# Patient Record
Sex: MALE | Race: BLACK OR AFRICAN AMERICAN | Employment: UNEMPLOYED | URBAN - METROPOLITAN AREA
[De-identification: names, ages, dates, MRNs, and addresses within clinical notes are randomized per-mention and may not be internally consistent; named-entity substitution may affect disease eponyms.]

---

## 2017-04-13 ENCOUNTER — OFFICE VISIT (OUTPATIENT)
Dept: FAMILY MEDICINE | Facility: CLINIC | Age: 27
End: 2017-04-13

## 2017-04-13 VITALS
TEMPERATURE: 98.4 F | BODY MASS INDEX: 46.58 KG/M2 | OXYGEN SATURATION: 97 % | HEART RATE: 80 BPM | HEIGHT: 67 IN | DIASTOLIC BLOOD PRESSURE: 85 MMHG | WEIGHT: 296.8 LBS | SYSTOLIC BLOOD PRESSURE: 138 MMHG

## 2017-04-13 DIAGNOSIS — Z00.00 ROUTINE GENERAL MEDICAL EXAMINATION AT A HEALTH CARE FACILITY: Primary | ICD-10-CM

## 2017-04-13 DIAGNOSIS — H61.21 IMPACTED CERUMEN OF RIGHT EAR: ICD-10-CM

## 2017-04-13 DIAGNOSIS — K21.9 GASTROESOPHAGEAL REFLUX DISEASE WITHOUT ESOPHAGITIS: ICD-10-CM

## 2017-04-13 DIAGNOSIS — E66.01 MORBID OBESITY DUE TO EXCESS CALORIES (H): ICD-10-CM

## 2017-04-13 LAB
CHOLEST SERPL-MCNC: 185 MG/DL
CHOLEST/HDLC SERPL: 3.6 RATIO
HBA1C MFR BLD: 5.6 % (ref 4.1–5.7)
HDLC SERPL-MCNC: 52 MG/DL
LDLC SERPL CALC-MCNC: 116 MG/DL (ref 0–99)
TRIGL SERPL-MCNC: 87 MG/DL
VLDL-CHOLESTEROL: 17 MG/DL (ref 7–32)

## 2017-04-13 NOTE — PROGRESS NOTES
Male Physical Note      Concerns today:     Ear check: history of cerumen impaction and would like ears checked    Heart burn:   - has a few times a week depending on what he eats  - notices is worse after spicy or fatty foods  - in the past has used Prilosec, not currently using any meds  - no weight loss, difficulty swallowing, hematemesis     Right thumb pain:   - pain at base of thumb for past 1 week  - doesn't remember exact injury, thinks he hurt it while lifting a heavy box  - no swelling or bruising  - pain has been getting better over the past few days    Family Hx: dad has diabetes, mom HTN and depression  PMH: GERD  PSH: none  Meds: none  Social: single, has a 5 yo daughter, not working, lives with mother, non-smoker, no alcohol, no drug use    ROS:                      CONSTITUTIONAL: no fatigue, no unexpected change in weight  SKIN: no worrisome rashes, no worrisome moles, no worrisome lesions  EYES: no acute vision problems or changes  ENT:  no mouth problems, no throat problems  RESP: no significant cough, no shortness of breath  CV: no chest pain, no palpitations, no new or worsening peripheral edema  GI: no nausea, no vomiting, no constipation, no diarrhea  : no frequency, no dysuria, no hematuria  NEURO: no weakness, no dizziness, no syncope, no headaches    Past Medical History:   Diagnosis Date     NO ACTIVE PROBLEMS         Family History   Problem Relation Age of Onset     Hypertension Mother      Asthma Brother      DIABETES No family hx of      Coronary Artery Disease No family hx of      Breast Cancer No family hx of      Prostate Cancer No family hx of      Colon Cancer No family hx of      Other Cancer No family hx of      Reviewed no other significant FH           Family History and past Medical History reviewed and unchanged/updated.    Social History   Substance Use Topics     Smoking status: Never Smoker     Smokeless tobacco: Not on file     Alcohol use No     Single  Children ?  "yes 5 yo daughter, doesn't see her often, lives with her mom    Has anyone hurt you physically, for example by pushing, hitting, slapping or kicking you or forcing you to have sex? Denies  Do you feel threatened or controlled by a partner, ex-partner or anyone in your life? Denies    RISK BEHAVIORS AND HEALTHY HABITS:  Tobacco Use/Smoking: None  Illicit Drug Use: None  ETOH: None  Do you use alcohol? No  Sexually Active: Yes and At risk sexual behavior?  No  Diet (5-7 servings of fruits/veg daily): No   Exercise (30 min accumulated most days):No  Dental Care: Yes   Calcium 1500 mg/d:    Seat Belt Use: Yes       Immunization History   Administered Date(s) Administered     TDAP Vaccine (Boostrix) 10/13/2016     Reviewed Immunization Record Today    EXAMINATION:  /82 (BP Location: Right arm, Patient Position: Chair, Cuff Size: Adult Large)  Pulse 92  Temp 98.4  F (36.9  C) (Oral)  Ht 5' 7.25\" (170.8 cm)  Wt 296 lb 12.8 oz (134.6 kg)  SpO2 97%  BMI 46.14 kg/m2  GENERAL: healthy, alert and no distress  EYES: Eyes grossly normal to inspection, extraocular movements - intact, and PERRL  HENT: ear canals- right ear with cerumen impaction, left canal is clear; TMs- normal; Nose- normal; Mouth- no ulcers, no lesions  NECK: no tenderness, no adenopathy, no asymmetry, no masses, no stiffness; thyroid- normal to palpation  RESP: lungs clear to auscultation - no rales, no rhonchi, no wheezes  CV: regular rates and rhythm, normal S1 S2, no S3 or S4 and no murmur, no click or rub -  ABDOMEN: soft, no tenderness, no  hepatosplenomegaly, no masses, normal bowel sounds  MS: extremities- no gross deformities noted, no edema, right thumb with mild tenderness near the radial side of the MCP joint, no edema or erythema, full ROM intact, normal strength, no ligamentous laxity  SKIN: no suspicious lesions, no rashes  NEURO: strength and tone- normal, sensory exam- grossly normal, mentation- intact, speech- normal, reflexes- " symmetric  BACK: no CVA tenderness, no paralumbar tenderness  PSYCH: Alert and oriented times 3; speech- coherent , normal rate and volume; able to articulate logical thoughts, able to abstract reason, no tangential thoughts, no hallucinations or delusions, affect- normal  LYMPHATICS: ant. cervical- normal, post. cervical- normal, axillary- normal, supraclavicular- normal, inguinal- normal    Results for orders placed or performed in visit on 04/13/17   Hemoglobin A1c ([facility])   Result Value Ref Range    Hemoglobin A1C 5.6 4.1 - 5.7 %   Lipid Panel ([facility]) - Results < 1 hr   Result Value Ref Range    Cholesterol 185.0 <200.0 mg/dL    Triglycerides 87.0 <150.0 mg/dL    HDL Cholesterol 52.0 >40.0 mg/dL    VLDL-Cholesterol 17.0 7.0 - 32.0 mg/dL    LDL Cholesterol Direct 116.0 (H) 0.0 - 99.0 mg/dL    Cholesterol/HDL Ratio 3.6 <5.0 RATIO         ASSESSMENT/PLAN:    Morbid obesity: BMI 46. Patient reports eating fast food daily. Has 1-2 sugary drinks daily. Is not physically active. Patient is at the contemplative state for behavior change.   - Discussed cutting out fast food and sugary drinks, working on increasing fruits and vegetables, starting a routine to get exercise/activity almost everyday  - checked lipids and A1c  - follow up in 6-8 weeks, discuss referral to nutrition/obesity clinic at that time    HCM:   - lipids and A1c checked    GERD:   - Discussed diet modification  - ranitidine 150 mg BID PRN    Cerumen impaction: right ear irrigated in clinic    Right thumb sprain: no evidence of significant injury on exam. Symptoms are improving. Suspect mild sprain. Recommend icing and using Tylenol as needed.     Follow up 6-8 weeks    Manny Barton MD PGY3  [facility]   Pager# [phone]    Precepted with : Dr. Victor

## 2017-04-13 NOTE — MR AVS SNAPSHOT
After Visit Summary   4/13/2017    Joann Navarrete    MRN: [id number]           Patient Information     Date Of Birth          1990        Visit Information        Provider Department      4/13/2017 2:40 PM Manny Barton MD [facility]        Today's Diagnoses     Routine general medical examination at a health care facility    -  1    Gastroesophageal reflux disease without esophagitis        Obesity due to excess calories, unspecified obesity severity          Care Instructions    Try to get exercise or activity everyday. Start walking more or riding bike.   Cut down on fast food. Cut out sugary juice and pop.   Eat more fruits, vegetables.     Preventive Health Recommendations  Male Ages 18 - 25     Yearly exam:             See your health care provider every year in order to  o   Review health changes.   o   Discuss preventive care.    o   Review your medicines if your doctor has prescribed any.    You should be tested each year for STDs (sexually transmitted diseases).     Talk to your provider about cholesterol testing.      If you are at risk for diabetes, you should have a diabetes test (fasting glucose).    Shots: Get a flu shot each year. Get a tetanus shot every 10 years.     Nutrition:    Eat at least 5 servings of fruits and vegetables daily.     Eat whole-grain bread, whole-wheat pasta and brown rice instead of white grains and rice.     Talk to your provider about calcium and Vitamin D.     Lifestyle    Exercise for at least 150 minutes a week (30 minutes a day, 5 days a week). This will help you control your weight and prevent disease.     Limit alcohol to one drink per day.     No smoking.     Wear sunscreen to prevent skin cancer.     See your dentist every six months for an exam and cleaning.   Foods you may want to avoid for heart burn include:    Peppers.    Chocolate.    High-fat foods, including fried foods.    Spicy foods.    Garlic and onions.     "Citrus fruits, including oranges, grapefruit, gisselle, and limes.    Food containing tomatoes or tomato products.    Mint.    Carbonated drinks, caffeinated drinks, and alcohol.    Vinegar.          Follow-ups after your visit        Who to contact     Please call your clinic at [phone] to:    Ask questions about your health    Make or cancel appointments    Discuss your medicines    Learn about your test results    Speak to your doctor   If you have compliments or concerns about an experience at your clinic, or if you wish to file a complaint, please contact [facility] Patient Relations at [phone] or email us at [email or web address]         Additional Information About Your Visit        [employer] Information     [employer] gives you secure access to your electronic health record. If you see a primary care provider, you can also send messages to your care team and make appointments. If you have questions, please call your primary care clinic.  If you do not have a primary care provider, please call [phone] and they will assist you.      [employer] is an electronic gateway that provides easy, online access to your medical records. With [employer], you can request a clinic appointment, read your test results, renew a prescription or communicate with your care team.     To access your existing account, please contact your [facility] Clinic or call [phone] for assistance.        Care EveryWhere ID     This is your Care EveryWhere ID. This could be used by other organizations to access your [place] medical records  [id number]        Your Vitals Were     Pulse Temperature Height Pulse Oximetry BMI (Body Mass Index)       80 98.4  F (36.9  C) (Oral) 5' 7.25\" (170.8 cm) 97% 46.14 kg/m2        Blood Pressure from Last 3 Encounters:   04/13/17 138/85   10/26/16 137/80   10/19/16 136/84    Weight from Last 3 Encounters:   04/13/17 296 lb 12.8 oz (134.6 " kg)   10/26/16 273 lb (123.8 kg)   10/19/16 273 lb (123.8 kg)              We Performed the Following     Hemoglobin A1c ([facility])     Lipid Panel ([facility]) - Results < 1 hr          Today's Medication Changes          These changes are accurate as of: 4/13/17  4:28 PM.  If you have any questions, ask your nurse or doctor.               Start taking these medicines.        Dose/Directions    ranitidine 150 MG tablet   Commonly known as:  ZANTAC   Used for:  Gastroesophageal reflux disease without esophagitis   Started by:  Manny Barton MD        Dose:  150 mg   Take 1 tablet (150 mg) by mouth 2 times daily   Quantity:  60 tablet   Refills:  1            Where to get your medicines      These medications were sent to [id number] - [place], [place] - [address] AT [address]  [address] [address] , [place] [address]     Phone:  [phone]     ranitidine 150 MG tablet                Primary Care Provider Office Phone # Fax #    Charla Jade [phone]       [facility] [address] [place] [address]        Thank you!     Thank you for choosing [facility]  for your care. Our goal is always to provide you with excellent care. Hearing back from our patients is one way we can continue to improve our services. Please take a few minutes to complete the written survey that you may receive in the mail after your visit with us. Thank you!             Your Updated Medication List - Protect others around you: Learn how to safely use, store and throw away your medicines at www.disposemymeds.org.          This list is accurate as of: 4/13/17  4:28 PM.  Always use your most recent med list.                   Brand Name Dispense Instructions for use    acetaminophen 325 MG tablet    TYLENOL    100 tablet    Take 2 tablets (650 mg) by mouth every 4 hours as needed for mild pain       amoxicillin-clavulanate 500-125 MG per  tablet    AUGMENTIN    30 tablet    Take 1 tablet by mouth 3 times daily       carbamide peroxide 6.5 % otic solution    DEBROX    30 mL    Place 5-10 drops into both ears daily as needed for other       * cyclobenzaprine 10 MG tablet    FLEXERIL     Take 10 mg by mouth Reported on 4/13/2017       * cyclobenzaprine 5 MG tablet    FLEXERIL    42 tablet    Take 1-2 tablets (5-10 mg) by mouth 3 times daily as needed for muscle spasms       ibuprofen 200 MG tablet    ADVIL/MOTRIN    120 tablet    Take 1 tablet (200 mg) by mouth every 4 hours as needed for mild pain       ranitidine 150 MG tablet    ZANTAC    60 tablet    Take 1 tablet (150 mg) by mouth 2 times daily       * Notice:  This list has 2 medication(s) that are the same as other medications prescribed for you. Read the directions carefully, and ask your doctor or other care provider to review them with you.

## 2017-04-13 NOTE — PROGRESS NOTES
Preceptor Attestation:  Patient's case reviewed and discussed with Manny Barton MD Patient seen and discussed with the resident.. I agree with assessment and plan of care.  Supervising Physician:  Vitor Victor MD  [facility]

## 2017-04-13 NOTE — PATIENT INSTRUCTIONS
Try to get exercise or activity everyday. Start walking more or riding bike.   Cut down on fast food. Cut out sugary juice and pop.   Eat more fruits, vegetables.     Preventive Health Recommendations  Male Ages 18 - 25     Yearly exam:             See your health care provider every year in order to  o   Review health changes.   o   Discuss preventive care.    o   Review your medicines if your doctor has prescribed any.    You should be tested each year for STDs (sexually transmitted diseases).     Talk to your provider about cholesterol testing.      If you are at risk for diabetes, you should have a diabetes test (fasting glucose).    Shots: Get a flu shot each year. Get a tetanus shot every 10 years.     Nutrition:    Eat at least 5 servings of fruits and vegetables daily.     Eat whole-grain bread, whole-wheat pasta and brown rice instead of white grains and rice.     Talk to your provider about calcium and Vitamin D.     Lifestyle    Exercise for at least 150 minutes a week (30 minutes a day, 5 days a week). This will help you control your weight and prevent disease.     Limit alcohol to one drink per day.     No smoking.     Wear sunscreen to prevent skin cancer.     See your dentist every six months for an exam and cleaning.   Foods you may want to avoid for heart burn include:    Peppers.    Chocolate.    High-fat foods, including fried foods.    Spicy foods.    Garlic and onions.    Citrus fruits, including oranges, grapefruit, gisselle, and limes.    Food containing tomatoes or tomato products.    Mint.    Carbonated drinks, caffeinated drinks, and alcohol.    Vinegar.

## 2017-04-23 PROBLEM — E66.01 MORBID OBESITY DUE TO EXCESS CALORIES (H): Status: ACTIVE | Noted: 2017-04-23

## 2017-04-23 PROBLEM — K21.9 GASTROESOPHAGEAL REFLUX DISEASE WITHOUT ESOPHAGITIS: Status: ACTIVE | Noted: 2017-04-23

## 2017-07-05 ENCOUNTER — OFFICE VISIT (OUTPATIENT)
Dept: FAMILY MEDICINE | Facility: CLINIC | Age: 27
End: 2017-07-05

## 2017-07-05 VITALS
DIASTOLIC BLOOD PRESSURE: 84 MMHG | TEMPERATURE: 98.2 F | WEIGHT: 296 LBS | BODY MASS INDEX: 46.02 KG/M2 | HEART RATE: 90 BPM | OXYGEN SATURATION: 92 % | SYSTOLIC BLOOD PRESSURE: 129 MMHG
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
## 2017-07-05 DIAGNOSIS — T14.8XXA BITE WOUND: ICD-10-CM

## 2017-07-05 DIAGNOSIS — S51.851A HUMAN BITE OF FOREARM, RIGHT, INITIAL ENCOUNTER: Primary | ICD-10-CM

## 2017-07-05 LAB — HIV 1+2 AB+HIV1 P24 AG SERPL QL IA: NEGATIVE
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
## 2017-07-05 NOTE — PROGRESS NOTES
Preceptor Attestation:  Patient's case reviewed and discussed with Manny Velez MD Patient seen and discussed with the resident.. I agree with assessment and plan of care.  Supervising Physician:  Horacio Diane MD  [facility]

## 2017-07-08 LAB — CULTURE: NORMAL

## 2018-05-04 ENCOUNTER — OFFICE VISIT (OUTPATIENT)
Dept: FAMILY MEDICINE | Facility: CLINIC | Age: 28
End: 2018-05-04
Payer: COMMERCIAL

## 2018-05-04 VITALS
BODY MASS INDEX: 45.83 KG/M2 | TEMPERATURE: 97.9 F | WEIGHT: 292 LBS | HEIGHT: 67 IN | OXYGEN SATURATION: 97 % | HEART RATE: 79 BPM | SYSTOLIC BLOOD PRESSURE: 133 MMHG | DIASTOLIC BLOOD PRESSURE: 84 MMHG | RESPIRATION RATE: 18 BRPM

## 2018-05-04 DIAGNOSIS — R06.2 WHEEZING: Primary | ICD-10-CM

## 2018-05-04 DIAGNOSIS — M25.469 KNEE SWELLING: ICD-10-CM

## 2018-05-04 RX ORDER — ACETAMINOPHEN 325 MG/1
650 TABLET ORAL EVERY 4 HOURS PRN
Qty: 100 TABLET | Refills: 1 | Status: SHIPPED | OUTPATIENT
Start: 2018-05-04

## 2018-05-04 RX ORDER — IBUPROFEN 600 MG/1
600 TABLET, FILM COATED ORAL EVERY 6 HOURS PRN
Qty: 60 TABLET | Refills: 0 | Status: SHIPPED | OUTPATIENT
Start: 2018-05-04

## 2018-05-04 RX ORDER — ALBUTEROL SULFATE 0.83 MG/ML
1 SOLUTION RESPIRATORY (INHALATION) EVERY 6 HOURS PRN
Qty: 25 VIAL | Refills: 1 | Status: SHIPPED | OUTPATIENT
Start: 2018-05-04

## 2018-05-04 NOTE — MR AVS SNAPSHOT
"              After Visit Summary   5/4/2018    Joann Navarrete    MRN: [id number]           Patient Information     Date Of Birth          1990        Visit Information        Provider Department      5/4/2018 3:40 PM Chantel Field MD [facility]        Today's Diagnoses     Wheezing    -  1    Knee swelling        Bilateral impacted cerumen        Mass of left wrist           Follow-ups after your visit        Who to contact     Please call your clinic at [phone] to:    Ask questions about your health    Make or cancel appointments    Discuss your medicines    Learn about your test results    Speak to your doctor            Additional Information About Your Visit        MyChart Information     [employer] gives you secure access to your electronic health record. If you see a primary care provider, you can also send messages to your care team and make appointments. If you have questions, please call your primary care clinic.  If you do not have a primary care provider, please call [phone] and they will assist you.      [employer] is an electronic gateway that provides easy, online access to your medical records. With [employer] [employer], you can request a clinic appointment, read your test results, renew a prescription or communicate with your care team.     To access your existing account, please contact your [facility] or call [phone] for assistance.        Care EveryWhere ID     This is your Care EveryWhere ID. This could be used by other organizations to access your [facility] medical records  [id number]        Your Vitals Were     Pulse Temperature Respirations Height Pulse Oximetry BMI (Body Mass Index)    79 97.9  F (36.6  C) (Oral) 18 5' 7.25\" (170.8 cm) 97% 45.39 kg/m2       Blood Pressure from Last 3 Encounters:   05/04/18 133/84   07/05/17 129/84   04/13/17 138/85    Weight from Last 3 Encounters:   05/04/18 292 lb (132.5 kg)   07/05/17 296 lb " (134.3 kg)   04/13/17 296 lb 12.8 oz (134.6 kg)              Today, you had the following     No orders found for display         Today's Medication Changes          These changes are accurate as of 5/4/18  4:30 PM.  If you have any questions, ask your nurse or doctor.               Start taking these medicines.        Dose/Directions    albuterol (2.5 MG/3ML) 0.083% neb solution   Used for:  Wheezing   Started by:  Chantel Field MD        Dose:  1 vial   Take 1 vial (2.5 mg) by nebulization every 6 hours as needed for shortness of breath / dyspnea or wheezing   Quantity:  25 vial   Refills:  1       ibuprofen 600 MG tablet   Commonly known as:  ADVIL/MOTRIN   Used for:  Knee swelling   Started by:  Chantel Field MD        Dose:  600 mg   Take 1 tablet (600 mg) by mouth every 6 hours as needed for moderate pain   Quantity:  60 tablet   Refills:  0       order for DME   Used for:  Wheezing   Started by:  Chantel Field MD        Equipment being ordered: Nebulizer   Quantity:  1 Device   Refills:  0            Where to get your medicines      These medications were sent to [id number] - [place], [place] - [address]  [address], [place] [address]     Phone:  [phone]     acetaminophen 325 MG tablet    albuterol (2.5 MG/3ML) 0.083% neb solution    ibuprofen 600 MG tablet         Some of these will need a paper prescription and others can be bought over the counter.  Ask your nurse if you have questions.     Bring a paper prescription for each of these medications     order for DME                Primary Care Provider Office Phone # Fax #    Charla Jade [phone]       [facility] [address] [place] [address]        Equal Access to Services     CHARMAINE TORRES AH: Renato Ruiz, chrissie ware, aidan kaalmasatish brito, jim marcos ah. So [facility]  [phone].    ATENCIÓN: Si edita sinclair, tiene a driver disposición servicios gratuitos de asistencia lingüística. Yadira kaur [phone].    We comply with applicable federal civil rights laws and [place] laws. We do not discriminate on the basis of race, color, national origin, age, disability, sex, sexual orientation, or gender identity.            Thank you!     Thank you for choosing [facility]  for your care. Our goal is always to provide you with excellent care. Hearing back from our patients is one way we can continue to improve our services. Please take a few minutes to complete the written survey that you may receive in the mail after your visit with us. Thank you!             Your Updated Medication List - Protect others around you: Learn how to safely use, store and throw away your medicines at www.disposemymeds.org.          This list is accurate as of 5/4/18  4:30 PM.  Always use your most recent med list.                   Brand Name Dispense Instructions for use Diagnosis    acetaminophen 325 MG tablet    TYLENOL    100 tablet    Take 2 tablets (650 mg) by mouth every 4 hours as needed for mild pain    Bilateral impacted cerumen, Mass of left wrist       albuterol (2.5 MG/3ML) 0.083% neb solution     25 vial    Take 1 vial (2.5 mg) by nebulization every 6 hours as needed for shortness of breath / dyspnea or wheezing    Wheezing       ibuprofen 600 MG tablet    ADVIL/MOTRIN    60 tablet    Take 1 tablet (600 mg) by mouth every 6 hours as needed for moderate pain    Knee swelling       order for DME     1 Device    Equipment being ordered: Nebulizer    Wheezing
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
## 2019-05-07 ENCOUNTER — TRANSFERRED RECORDS (OUTPATIENT)
Dept: HEALTH INFORMATION MANAGEMENT | Facility: CLINIC | Age: 29
End: 2019-05-07

## 2019-05-08 ENCOUNTER — TELEPHONE (OUTPATIENT)
Dept: FAMILY MEDICINE | Facility: CLINIC | Age: 29
End: 2019-05-08

## 2019-05-08 NOTE — TELEPHONE ENCOUNTER
Out going call f/u multiple ER visits with most recent 5/7/2019 at [facility]. LMTCB to discuss and assist with scheduling clinic visit follow up.

## 2020-03-10 ENCOUNTER — HEALTH MAINTENANCE LETTER (OUTPATIENT)
Age: 30
End: 2020-03-10

## 2020-12-27 ENCOUNTER — HEALTH MAINTENANCE LETTER (OUTPATIENT)
Age: 30
End: 2020-12-27

## 2021-04-24 ENCOUNTER — HEALTH MAINTENANCE LETTER (OUTPATIENT)
Age: 31
End: 2021-04-24

## 2021-10-04 ENCOUNTER — HEALTH MAINTENANCE LETTER (OUTPATIENT)
Age: 31
End: 2021-10-04

## 2022-05-15 ENCOUNTER — HEALTH MAINTENANCE LETTER (OUTPATIENT)
Age: 32
End: 2022-05-15

## 2022-09-11 ENCOUNTER — HEALTH MAINTENANCE LETTER (OUTPATIENT)
Age: 32
End: 2022-09-11

## 2023-06-03 ENCOUNTER — HEALTH MAINTENANCE LETTER (OUTPATIENT)
Age: 33
End: 2023-06-03